# Patient Record
Sex: FEMALE | NOT HISPANIC OR LATINO | Employment: OTHER | URBAN - METROPOLITAN AREA
[De-identification: names, ages, dates, MRNs, and addresses within clinical notes are randomized per-mention and may not be internally consistent; named-entity substitution may affect disease eponyms.]

---

## 2021-07-02 ENCOUNTER — THERAPY VISIT (OUTPATIENT)
Dept: PHYSICAL THERAPY | Facility: CLINIC | Age: 86
End: 2021-07-02
Payer: COMMERCIAL

## 2021-07-02 DIAGNOSIS — H81.11 BPPV (BENIGN PAROXYSMAL POSITIONAL VERTIGO), RIGHT: ICD-10-CM

## 2021-07-02 PROCEDURE — 95992 CANALITH REPOSITIONING PROC: CPT | Mod: GP | Performed by: PHYSICAL THERAPIST

## 2021-07-02 PROCEDURE — 97161 PT EVAL LOW COMPLEX 20 MIN: CPT | Mod: GP | Performed by: PHYSICAL THERAPIST

## 2021-07-02 NOTE — PROGRESS NOTES
Physical Therapy Initial Evaluation  Subjective:  The history is provided by the patient. No  was used.   Patient Health History  Nasreen Herman being seen for vertigo.     Problem began: 5/15/2021.   Problem occurred: not sure   Pain is reported as 0/10 on pain scale.  General health as reported by patient is fair.  Pertinent medical history includes: cancer, emphysema, high blood pressure, migraines/headaches and thyroid problems (COPD).   Red flags:  Severe dizziness.  Medical allergies: none.   Surgeries include:  Cancer surgery and other. Other surgery history details: gallbladder.    Current medications:  Thyroid medication and high blood pressure medication.    Current occupation is retired.                   S:  Nasreen is a 86 year old patient complaining of vertigo.  Feels like the bed is moving and flipping over when she lays down.  Also c/o a headache toward the back of the head.  Denies changes in hearing or vision  Onset of symptoms: Mid May for unknown reasons      Is this a recurrent problem: no  Progression since onset: no change  Frequency of episodes/time of day: movement dependent  Duration of episodes: seconds to minutes depending on the movement  Exacerbating factors: laying down, rolling to R  Relieving factors: stay still  Previous treatments:  N/A  Special tests/diagnostics performed: US- carotid arteries negative for significant stenosis    O:  Cervical ROM screen: Functional AROM in sitting  Oculomotor/gaze stability screen: smooth pursuits, gaze stab, and saccades all WNL  Vertebral artery test: negative  Hallpike-Thomaston maneuver:  R: positive for geotropic nystagmus  L: negative  Horizontal canal test:  R: not tested  L: not tested                      Objective:  System    Physical Exam    General     ROS    Assessment/Plan:    Patient is a 86 year old female with vestibular complaints.    Patient has the following significant findings with corresponding treatment plan.                 Diagnosis 1:  BPPV  Dizziness- CRMs    Cumulative Therapy Evaluation is: Low complexity.    Previous and current functional limitations:  (See Goal Flow Sheet for this information)    Short term and Long term goals: (See Goal Flow Sheet for this information)     Communication ability:  Patient appears to be able to clearly communicate and understand verbal and written communication and follow directions correctly.  Treatment Explanation - The following has been discussed with the patient:   RX ordered/plan of care  Anticipated outcomes  Possible risks and side effects  This patient would benefit from PT intervention to resume normal activities.   Rehab potential is good.    Frequency:  1 X week, once daily  Duration:  for 3 weeks  Discharge Plan:  Achieve all LTG.  Independent in home treatment program.  Reach maximal therapeutic benefit.    Please refer to the daily flowsheet for treatment today, total treatment time and time spent performing 1:1 timed codes.

## 2021-07-09 ENCOUNTER — THERAPY VISIT (OUTPATIENT)
Dept: PHYSICAL THERAPY | Facility: CLINIC | Age: 86
End: 2021-07-09
Payer: COMMERCIAL

## 2021-07-09 DIAGNOSIS — H81.11 BPPV (BENIGN PAROXYSMAL POSITIONAL VERTIGO), RIGHT: ICD-10-CM

## 2021-07-09 PROCEDURE — 95992 CANALITH REPOSITIONING PROC: CPT | Mod: GP | Performed by: PHYSICAL THERAPIST

## 2021-07-12 ENCOUNTER — THERAPY VISIT (OUTPATIENT)
Dept: PHYSICAL THERAPY | Facility: CLINIC | Age: 86
End: 2021-07-12
Payer: COMMERCIAL

## 2021-07-12 DIAGNOSIS — H81.11 BPPV (BENIGN PAROXYSMAL POSITIONAL VERTIGO), RIGHT: ICD-10-CM

## 2021-07-12 PROCEDURE — 95992 CANALITH REPOSITIONING PROC: CPT | Mod: GP | Performed by: PHYSICAL THERAPIST

## 2021-07-12 NOTE — PROGRESS NOTES
PROGRESS  REPORT    Progress reporting period is from 7/2/21 to 7/12/21.       SUBJECTIVE  Subjective: Felt good for 48 hours after last treatment.  Then woke up with dizziness again and it lasted until about noon    Current Pain level: 0/10.     Initial Pain level: 0/10.   Changes in function:  None  Adverse reaction to treatment or activity: None    OBJECTIVE  Objective: (+) McKee-Hallpike to R     ASSESSMENT/PLAN  Updated problem list and treatment plan: Diagnosis 1:  BPPV  Dizziness- CRMs  STG/LTGs have been met or progress has been made towards goals:  None  Assessment of Progress: Improvement is very temporary  Self Management Plans:  Patient has been instructed in a home treatment program.  Nasreen continues to require the following intervention to meet STG and LTG's:  PT    Recommendations:  This patient would benefit from continued therapy.     Frequency:  1 X week, once daily  Duration:  for 3 weeks        Please refer to the daily flowsheet for treatment today, total treatment time and time spent performing 1:1 timed codes.

## 2021-07-19 ENCOUNTER — THERAPY VISIT (OUTPATIENT)
Dept: PHYSICAL THERAPY | Facility: CLINIC | Age: 86
End: 2021-07-19
Payer: COMMERCIAL

## 2021-07-19 DIAGNOSIS — H81.11 BPPV (BENIGN PAROXYSMAL POSITIONAL VERTIGO), RIGHT: ICD-10-CM

## 2021-07-19 PROCEDURE — 95992 CANALITH REPOSITIONING PROC: CPT | Mod: GP | Performed by: PHYSICAL THERAPIST

## 2021-08-12 ENCOUNTER — TELEPHONE (OUTPATIENT)
Dept: PHYSICAL THERAPY | Facility: CLINIC | Age: 86
End: 2021-08-12

## 2021-08-12 DIAGNOSIS — H81.11 BPPV (BENIGN PAROXYSMAL POSITIONAL VERTIGO), RIGHT: ICD-10-CM

## 2021-08-12 NOTE — TELEPHONE ENCOUNTER
Nasreen called stating her vertigo has returned and it's not improving.  It never fully resolved, just got a little better but now back to baseline.  I recommended she follow up at a vestibular clinic for more testing treatment.  She will call her neurologist to seek out a referral.  I gave [facility] the options of [facility], the [facility] or she could speak to her neuro about options through [facility]

## 2022-06-27 PROBLEM — H81.11 BPPV (BENIGN PAROXYSMAL POSITIONAL VERTIGO), RIGHT: Status: RESOLVED | Noted: 2021-07-02 | Resolved: 2022-06-27

## 2022-06-27 NOTE — PROGRESS NOTES
Discharge Note    Progress reporting period is from initial evaluation date (please see noted date below) to Jul 19, 2021.  Linked Episodes   Type: Episode: Status: Noted: Resolved: Last update: Updated by:   PHYSICAL THERAPY Vertigo 7/2/21 Active 7/2/2021 8/12/2021 10:35 AM Benson Borges PT      Comments:       See phone encounter from 8/12/21.  Please see information below for last relevant information on current status.  Patient seen for 4 visits.    SUBJECTIVE  Subjective changes noted by patient:  It hasn't been a great week.  It feels like there are instances of dizziness with almost all head movements now, not just rolling to R  .  Current pain level is 0/10.     Previous pain level was  0/10.   Changes in function:  Yes (See Goal flowsheet attached for changes in current functional level)  Adverse reaction to treatment or activity: None    OBJECTIVE  Changes noted in objective findings: (+) Madiha-Hallpike to R- this actually resolved more quickly than we have seen in the past     ASSESSMENT/PLAN  Diagnosis: BPPV   Updated problem list and treatment plan:   Dizziness  STG/LTGs have been met or progress has been made towards goals:  Yes, please see goal flowsheet for most current information  Assessment of Progress: current status is unknown.    Last current status:     Self Management Plans:  HEP  I have re-evaluated this patient and find that the nature, scope, duration and intensity of the therapy is appropriate for the medical condition of the patient.  Nasreen continues to require the following intervention to meet STG and LTG's:  HEP.    Recommendations:  Discharge with current home program.  Patient to follow up with MD as needed.    Please refer to the daily flowsheet for treatment today, total treatment time and time spent performing 1:1 timed codes.